# Patient Record
Sex: FEMALE | Race: WHITE | Employment: OTHER | URBAN - METROPOLITAN AREA
[De-identification: names, ages, dates, MRNs, and addresses within clinical notes are randomized per-mention and may not be internally consistent; named-entity substitution may affect disease eponyms.]

---

## 2017-03-17 PROBLEM — H02.403 PTOSIS, BILATERAL: Status: ACTIVE | Noted: 2017-03-17

## 2017-03-17 PROBLEM — H40.003 GLAUCOMA SUSPECT, BILATERAL: Status: ACTIVE | Noted: 2017-03-17

## 2017-12-05 PROCEDURE — 88305 TISSUE EXAM BY PATHOLOGIST: CPT | Performed by: INTERNAL MEDICINE

## 2018-02-12 PROBLEM — Z96.1 PSEUDOPHAKIA: Status: ACTIVE | Noted: 2018-02-12

## 2018-02-12 PROBLEM — E11.9 DIABETES MELLITUS TYPE 2 WITHOUT RETINOPATHY (HCC): Status: ACTIVE | Noted: 2018-02-12

## 2018-02-22 PROCEDURE — 81003 URINALYSIS AUTO W/O SCOPE: CPT | Performed by: FAMILY MEDICINE

## 2018-06-04 PROBLEM — R79.89 ELEVATED LFTS: Status: ACTIVE | Noted: 2018-06-04

## 2019-02-14 PROCEDURE — 82570 ASSAY OF URINE CREATININE: CPT | Performed by: FAMILY MEDICINE

## 2019-02-14 PROCEDURE — 82043 UR ALBUMIN QUANTITATIVE: CPT | Performed by: FAMILY MEDICINE

## 2019-02-19 PROCEDURE — 87086 URINE CULTURE/COLONY COUNT: CPT | Performed by: FAMILY MEDICINE

## 2019-02-19 PROCEDURE — 81001 URINALYSIS AUTO W/SCOPE: CPT | Performed by: FAMILY MEDICINE

## 2019-03-27 PROCEDURE — 86225 DNA ANTIBODY NATIVE: CPT | Performed by: INTERNAL MEDICINE

## 2019-03-27 PROCEDURE — 86038 ANTINUCLEAR ANTIBODIES: CPT | Performed by: INTERNAL MEDICINE

## 2019-03-27 PROCEDURE — 82085 ASSAY OF ALDOLASE: CPT | Performed by: INTERNAL MEDICINE

## 2019-03-27 PROCEDURE — 86235 NUCLEAR ANTIGEN ANTIBODY: CPT | Performed by: INTERNAL MEDICINE

## 2019-03-27 PROCEDURE — 83516 IMMUNOASSAY NONANTIBODY: CPT | Performed by: INTERNAL MEDICINE

## 2019-05-29 PROCEDURE — 86160 COMPLEMENT ANTIGEN: CPT | Performed by: INTERNAL MEDICINE

## 2019-05-29 PROCEDURE — 81001 URINALYSIS AUTO W/SCOPE: CPT | Performed by: INTERNAL MEDICINE

## 2019-05-29 PROCEDURE — 36415 COLL VENOUS BLD VENIPUNCTURE: CPT | Performed by: INTERNAL MEDICINE

## 2019-05-29 PROCEDURE — 87186 SC STD MICRODIL/AGAR DIL: CPT | Performed by: INTERNAL MEDICINE

## 2019-05-29 PROCEDURE — 86376 MICROSOMAL ANTIBODY EACH: CPT | Performed by: INTERNAL MEDICINE

## 2019-05-29 PROCEDURE — 87077 CULTURE AEROBIC IDENTIFY: CPT | Performed by: INTERNAL MEDICINE

## 2019-05-29 PROCEDURE — 87086 URINE CULTURE/COLONY COUNT: CPT | Performed by: INTERNAL MEDICINE

## 2019-05-29 PROCEDURE — 86800 THYROGLOBULIN ANTIBODY: CPT | Performed by: INTERNAL MEDICINE

## 2019-07-09 PROCEDURE — 81003 URINALYSIS AUTO W/O SCOPE: CPT | Performed by: FAMILY MEDICINE

## 2019-07-09 PROCEDURE — 87086 URINE CULTURE/COLONY COUNT: CPT | Performed by: FAMILY MEDICINE

## 2019-09-23 PROCEDURE — 88304 TISSUE EXAM BY PATHOLOGIST: CPT | Performed by: OPHTHALMOLOGY

## 2020-01-29 PROBLEM — J84.9 ILD (INTERSTITIAL LUNG DISEASE) (HCC): Status: ACTIVE | Noted: 2020-01-29

## 2020-01-29 PROBLEM — K55.9 VASCULAR DISORDER OF INTESTINE, UNSPECIFIED (HCC): Status: ACTIVE | Noted: 2020-01-29

## 2021-06-03 NOTE — CM/SW NOTE
6/9/2021  SW received call from pt regarding concerns for discharge planning. Pt scheduled for a L knee surgery with Dr. Dora Bowles on 6/24. Pt confirms address and states she lives in a 1 level house with 0 steps to go in.  Pt lives alone and states her f will transport the pt placed on WILL CALL to [facility] form/ flow sheet completed. RN to attach and print out with After Visit Summary Packet.      [facility] [phone]    PLAN: [facility] pending med clear, [facility]

## 2021-06-22 ENCOUNTER — LAB ENCOUNTER (OUTPATIENT)
Dept: LAB | Facility: HOSPITAL | Age: 81
End: 2021-06-22
Attending: ORTHOPAEDIC SURGERY
Payer: MEDICARE

## 2021-06-22 DIAGNOSIS — Z01.818 PREOPERATIVE TESTING: ICD-10-CM

## 2021-06-22 DIAGNOSIS — E87.1 HYPONATREMIA: ICD-10-CM

## 2021-06-22 DIAGNOSIS — D64.9 ANEMIA, UNSPECIFIED TYPE: ICD-10-CM

## 2021-06-22 PROCEDURE — 36415 COLL VENOUS BLD VENIPUNCTURE: CPT

## 2021-06-22 PROCEDURE — 86901 BLOOD TYPING SEROLOGIC RH(D): CPT

## 2021-06-22 PROCEDURE — 86850 RBC ANTIBODY SCREEN: CPT

## 2021-06-22 PROCEDURE — 85025 COMPLETE CBC W/AUTO DIFF WBC: CPT

## 2021-06-22 PROCEDURE — 80048 BASIC METABOLIC PNL TOTAL CA: CPT

## 2021-06-22 PROCEDURE — 86900 BLOOD TYPING SEROLOGIC ABO: CPT

## 2021-06-22 NOTE — H&P
[facility]    PATIENT'S NAME: Rogelio Magdalene SIMMS   ATTENDING PHYSICIAN: Jony Chakraborty MD   PATIENT ACCOUNT#:   [id number]    LOCATION:    MEDICAL RECORD #:   [id number]       YOB: 1940  ADMISSION DATE:       06/24/2021    HIS pulse 76. She weighs 152 pounds. BMI of 30. HEENT:  Age appropriate, within normal limits. CHEST:  Clear to auscultation and percussion. HEART:  Regular rate and rhythm, normal S1 and S2, without murmurs or rubs. ABDOMEN:  Soft, nontender.   Normal den

## 2021-06-23 NOTE — PAT NURSING NOTE
Patient instructed to call Dr Mark Bailey regarding her allergy to sulfa which means she will not tolerate celebrex as ordered pre op

## 2021-06-24 ENCOUNTER — ANESTHESIA EVENT (OUTPATIENT)
Dept: SURGERY | Facility: HOSPITAL | Age: 81
End: 2021-06-24
Payer: MEDICARE

## 2021-06-24 ENCOUNTER — APPOINTMENT (OUTPATIENT)
Dept: GENERAL RADIOLOGY | Facility: HOSPITAL | Age: 81
End: 2021-06-24
Attending: PHYSICIAN ASSISTANT
Payer: MEDICARE

## 2021-06-24 ENCOUNTER — ANESTHESIA (OUTPATIENT)
Dept: SURGERY | Facility: HOSPITAL | Age: 81
End: 2021-06-24
Payer: MEDICARE

## 2021-06-24 ENCOUNTER — HOSPITAL ENCOUNTER (OUTPATIENT)
Facility: HOSPITAL | Age: 81
Discharge: NURSING, REHAB OR LONG-TERM CARE | End: 2021-06-27
Attending: ORTHOPAEDIC SURGERY | Admitting: ORTHOPAEDIC SURGERY
Payer: MEDICARE

## 2021-06-24 DIAGNOSIS — Z01.818 PREOPERATIVE TESTING: Primary | ICD-10-CM

## 2021-06-24 DIAGNOSIS — M17.12 PRIMARY OSTEOARTHRITIS OF LEFT KNEE: ICD-10-CM

## 2021-06-24 PROCEDURE — 88311 DECALCIFY TISSUE: CPT | Performed by: ORTHOPAEDIC SURGERY

## 2021-06-24 PROCEDURE — 0SRD0J9 REPLACEMENT OF LEFT KNEE JOINT WITH SYNTHETIC SUBSTITUTE, CEMENTED, OPEN APPROACH: ICD-10-PCS | Performed by: ORTHOPAEDIC SURGERY

## 2021-06-24 PROCEDURE — 73560 X-RAY EXAM OF KNEE 1 OR 2: CPT | Performed by: PHYSICIAN ASSISTANT

## 2021-06-24 PROCEDURE — 88305 TISSUE EXAM BY PATHOLOGIST: CPT | Performed by: ORTHOPAEDIC SURGERY

## 2021-06-24 PROCEDURE — 82962 GLUCOSE BLOOD TEST: CPT

## 2021-06-24 DEVICE — REFOBACIN BC R 1X40 US: Type: IMPLANTABLE DEVICE | Site: KNEE | Status: FUNCTIONAL

## 2021-06-24 DEVICE — PSN FEM PS CMT CCR NRW SZ5 L: Type: IMPLANTABLE DEVICE | Site: KNEE | Status: FUNCTIONAL

## 2021-06-24 DEVICE — PSN STR HYB ST 14X+30 M: Type: IMPLANTABLE DEVICE | Site: KNEE | Status: FUNCTIONAL

## 2021-06-24 DEVICE — PSN TIB STM 5 DEG SZ C L: Type: IMPLANTABLE DEVICE | Site: KNEE | Status: FUNCTIONAL

## 2021-06-24 DEVICE — PSNA CM FM/CM TB/CPS VE SUR/ST: Type: IMPLANTABLE DEVICE | Status: FUNCTIONAL

## 2021-06-24 DEVICE — PSN ALL POLY PAT PLY 32MM: Type: IMPLANTABLE DEVICE | Site: KNEE | Status: FUNCTIONAL

## 2021-06-24 RX ORDER — BUPIVACAINE HYDROCHLORIDE 7.5 MG/ML
INJECTION, SOLUTION INTRASPINAL AS NEEDED
Status: DISCONTINUED | OUTPATIENT
Start: 2021-06-24 | End: 2021-06-24 | Stop reason: SURG

## 2021-06-24 RX ORDER — DEXAMETHASONE SODIUM PHOSPHATE 4 MG/ML
VIAL (ML) INJECTION AS NEEDED
Status: DISCONTINUED | OUTPATIENT
Start: 2021-06-24 | End: 2021-06-24 | Stop reason: SURG

## 2021-06-24 RX ORDER — HYDROCODONE BITARTRATE AND ACETAMINOPHEN 5; 325 MG/1; MG/1
1 TABLET ORAL AS NEEDED
Status: DISCONTINUED | OUTPATIENT
Start: 2021-06-24 | End: 2021-06-24 | Stop reason: HOSPADM

## 2021-06-24 RX ORDER — DIPHENHYDRAMINE HCL 25 MG
25 CAPSULE ORAL EVERY 4 HOURS PRN
Status: DISCONTINUED | OUTPATIENT
Start: 2021-06-24 | End: 2021-06-27

## 2021-06-24 RX ORDER — CEFAZOLIN SODIUM/WATER 2 G/20 ML
2 SYRINGE (ML) INTRAVENOUS EVERY 8 HOURS
Status: COMPLETED | OUTPATIENT
Start: 2021-06-24 | End: 2021-06-25

## 2021-06-24 RX ORDER — SENNOSIDES 8.6 MG
17.2 TABLET ORAL NIGHTLY
Status: DISCONTINUED | OUTPATIENT
Start: 2021-06-24 | End: 2021-06-27

## 2021-06-24 RX ORDER — DEXTROSE MONOHYDRATE 25 G/50ML
50 INJECTION, SOLUTION INTRAVENOUS
Status: DISCONTINUED | OUTPATIENT
Start: 2021-06-24 | End: 2021-06-24 | Stop reason: HOSPADM

## 2021-06-24 RX ORDER — HYDRALAZINE HYDROCHLORIDE 25 MG/1
25 TABLET, FILM COATED ORAL 2 TIMES DAILY
Status: DISCONTINUED | OUTPATIENT
Start: 2021-06-24 | End: 2021-06-27

## 2021-06-24 RX ORDER — MORPHINE SULFATE 4 MG/ML
4 INJECTION, SOLUTION INTRAMUSCULAR; INTRAVENOUS EVERY 10 MIN PRN
Status: DISCONTINUED | OUTPATIENT
Start: 2021-06-24 | End: 2021-06-24 | Stop reason: HOSPADM

## 2021-06-24 RX ORDER — HYDROCODONE BITARTRATE AND ACETAMINOPHEN 10; 325 MG/1; MG/1
1 TABLET ORAL EVERY 4 HOURS PRN
Status: DISCONTINUED | OUTPATIENT
Start: 2021-06-24 | End: 2021-06-27

## 2021-06-24 RX ORDER — POLYETHYLENE GLYCOL 3350 17 G/17G
17 POWDER, FOR SOLUTION ORAL DAILY PRN
Status: DISCONTINUED | OUTPATIENT
Start: 2021-06-24 | End: 2021-06-27

## 2021-06-24 RX ORDER — HYDROMORPHONE HYDROCHLORIDE 1 MG/ML
0.6 INJECTION, SOLUTION INTRAMUSCULAR; INTRAVENOUS; SUBCUTANEOUS EVERY 5 MIN PRN
Status: DISCONTINUED | OUTPATIENT
Start: 2021-06-24 | End: 2021-06-24 | Stop reason: HOSPADM

## 2021-06-24 RX ORDER — DIPHENHYDRAMINE HYDROCHLORIDE 50 MG/ML
25 INJECTION INTRAMUSCULAR; INTRAVENOUS ONCE AS NEEDED
Status: ACTIVE | OUTPATIENT
Start: 2021-06-24 | End: 2021-06-24

## 2021-06-24 RX ORDER — ACETAMINOPHEN 500 MG
1000 TABLET ORAL ONCE
Status: DISCONTINUED | OUTPATIENT
Start: 2021-06-24 | End: 2021-06-24 | Stop reason: HOSPADM

## 2021-06-24 RX ORDER — LIDOCAINE HYDROCHLORIDE 10 MG/ML
INJECTION, SOLUTION EPIDURAL; INFILTRATION; INTRACAUDAL; PERINEURAL AS NEEDED
Status: DISCONTINUED | OUTPATIENT
Start: 2021-06-24 | End: 2021-06-24 | Stop reason: SURG

## 2021-06-24 RX ORDER — LEVOTHYROXINE SODIUM 0.03 MG/1
25 TABLET ORAL DAILY
Status: DISCONTINUED | OUTPATIENT
Start: 2021-06-25 | End: 2021-06-27

## 2021-06-24 RX ORDER — ASPIRIN 325 MG
325 TABLET, DELAYED RELEASE (ENTERIC COATED) ORAL 2 TIMES DAILY
Status: DISCONTINUED | OUTPATIENT
Start: 2021-06-24 | End: 2021-06-27

## 2021-06-24 RX ORDER — NALOXONE HYDROCHLORIDE 0.4 MG/ML
80 INJECTION, SOLUTION INTRAMUSCULAR; INTRAVENOUS; SUBCUTANEOUS AS NEEDED
Status: DISCONTINUED | OUTPATIENT
Start: 2021-06-24 | End: 2021-06-24 | Stop reason: HOSPADM

## 2021-06-24 RX ORDER — MORPHINE SULFATE 4 MG/ML
2 INJECTION, SOLUTION INTRAMUSCULAR; INTRAVENOUS EVERY 10 MIN PRN
Status: DISCONTINUED | OUTPATIENT
Start: 2021-06-24 | End: 2021-06-24 | Stop reason: HOSPADM

## 2021-06-24 RX ORDER — MONTELUKAST SODIUM 10 MG/1
10 TABLET ORAL NIGHTLY
Status: DISCONTINUED | OUTPATIENT
Start: 2021-06-24 | End: 2021-06-27

## 2021-06-24 RX ORDER — DIPHENHYDRAMINE HYDROCHLORIDE 50 MG/ML
12.5 INJECTION INTRAMUSCULAR; INTRAVENOUS EVERY 4 HOURS PRN
Status: DISCONTINUED | OUTPATIENT
Start: 2021-06-24 | End: 2021-06-27

## 2021-06-24 RX ORDER — MIDAZOLAM HYDROCHLORIDE 1 MG/ML
INJECTION INTRAMUSCULAR; INTRAVENOUS AS NEEDED
Status: DISCONTINUED | OUTPATIENT
Start: 2021-06-24 | End: 2021-06-24 | Stop reason: SURG

## 2021-06-24 RX ORDER — DEXTROSE MONOHYDRATE 25 G/50ML
50 INJECTION, SOLUTION INTRAVENOUS
Status: DISCONTINUED | OUTPATIENT
Start: 2021-06-24 | End: 2021-06-27

## 2021-06-24 RX ORDER — DOCUSATE SODIUM 100 MG/1
100 CAPSULE, LIQUID FILLED ORAL 2 TIMES DAILY
Status: DISCONTINUED | OUTPATIENT
Start: 2021-06-24 | End: 2021-06-27

## 2021-06-24 RX ORDER — GABAPENTIN 300 MG/1
300 CAPSULE ORAL NIGHTLY
Status: DISCONTINUED | OUTPATIENT
Start: 2021-06-24 | End: 2021-06-27

## 2021-06-24 RX ORDER — ONDANSETRON 2 MG/ML
4 INJECTION INTRAMUSCULAR; INTRAVENOUS EVERY 4 HOURS PRN
Status: DISCONTINUED | OUTPATIENT
Start: 2021-06-24 | End: 2021-06-27

## 2021-06-24 RX ORDER — MORPHINE SULFATE 10 MG/ML
6 INJECTION, SOLUTION INTRAMUSCULAR; INTRAVENOUS EVERY 10 MIN PRN
Status: DISCONTINUED | OUTPATIENT
Start: 2021-06-24 | End: 2021-06-24 | Stop reason: HOSPADM

## 2021-06-24 RX ORDER — HALOPERIDOL 5 MG/ML
0.25 INJECTION INTRAMUSCULAR ONCE AS NEEDED
Status: DISCONTINUED | OUTPATIENT
Start: 2021-06-24 | End: 2021-06-24 | Stop reason: HOSPADM

## 2021-06-24 RX ORDER — PROCHLORPERAZINE EDISYLATE 5 MG/ML
5 INJECTION INTRAMUSCULAR; INTRAVENOUS ONCE AS NEEDED
Status: DISCONTINUED | OUTPATIENT
Start: 2021-06-24 | End: 2021-06-24 | Stop reason: HOSPADM

## 2021-06-24 RX ORDER — ONDANSETRON 2 MG/ML
INJECTION INTRAMUSCULAR; INTRAVENOUS AS NEEDED
Status: DISCONTINUED | OUTPATIENT
Start: 2021-06-24 | End: 2021-06-24 | Stop reason: SURG

## 2021-06-24 RX ORDER — ONDANSETRON 2 MG/ML
4 INJECTION INTRAMUSCULAR; INTRAVENOUS ONCE AS NEEDED
Status: DISCONTINUED | OUTPATIENT
Start: 2021-06-24 | End: 2021-06-24 | Stop reason: HOSPADM

## 2021-06-24 RX ORDER — HYDROCODONE BITARTRATE AND ACETAMINOPHEN 5; 325 MG/1; MG/1
1 TABLET ORAL EVERY 4 HOURS PRN
Status: DISCONTINUED | OUTPATIENT
Start: 2021-06-24 | End: 2021-06-27

## 2021-06-24 RX ORDER — HYDROMORPHONE HYDROCHLORIDE 1 MG/ML
0.2 INJECTION, SOLUTION INTRAMUSCULAR; INTRAVENOUS; SUBCUTANEOUS EVERY 5 MIN PRN
Status: DISCONTINUED | OUTPATIENT
Start: 2021-06-24 | End: 2021-06-24 | Stop reason: HOSPADM

## 2021-06-24 RX ORDER — SODIUM CHLORIDE, SODIUM LACTATE, POTASSIUM CHLORIDE, CALCIUM CHLORIDE 600; 310; 30; 20 MG/100ML; MG/100ML; MG/100ML; MG/100ML
INJECTION, SOLUTION INTRAVENOUS CONTINUOUS
Status: DISCONTINUED | OUTPATIENT
Start: 2021-06-24 | End: 2021-06-24 | Stop reason: HOSPADM

## 2021-06-24 RX ORDER — LORAZEPAM 0.5 MG/1
0.5 TABLET ORAL 2 TIMES DAILY PRN
Status: DISCONTINUED | OUTPATIENT
Start: 2021-06-24 | End: 2021-06-27

## 2021-06-24 RX ORDER — SODIUM CHLORIDE, SODIUM LACTATE, POTASSIUM CHLORIDE, CALCIUM CHLORIDE 600; 310; 30; 20 MG/100ML; MG/100ML; MG/100ML; MG/100ML
INJECTION, SOLUTION INTRAVENOUS CONTINUOUS
Status: DISCONTINUED | OUTPATIENT
Start: 2021-06-24 | End: 2021-06-27

## 2021-06-24 RX ORDER — HYDROCODONE BITARTRATE AND ACETAMINOPHEN 5; 325 MG/1; MG/1
2 TABLET ORAL AS NEEDED
Status: DISCONTINUED | OUTPATIENT
Start: 2021-06-24 | End: 2021-06-24 | Stop reason: HOSPADM

## 2021-06-24 RX ORDER — PROCHLORPERAZINE EDISYLATE 5 MG/ML
10 INJECTION INTRAMUSCULAR; INTRAVENOUS EVERY 6 HOURS PRN
Status: ACTIVE | OUTPATIENT
Start: 2021-06-24 | End: 2021-06-26

## 2021-06-24 RX ORDER — CEFAZOLIN SODIUM/WATER 2 G/20 ML
2 SYRINGE (ML) INTRAVENOUS ONCE
Status: COMPLETED | OUTPATIENT
Start: 2021-06-24 | End: 2021-06-24

## 2021-06-24 RX ORDER — HYDROMORPHONE HYDROCHLORIDE 1 MG/ML
0.4 INJECTION, SOLUTION INTRAMUSCULAR; INTRAVENOUS; SUBCUTANEOUS EVERY 5 MIN PRN
Status: DISCONTINUED | OUTPATIENT
Start: 2021-06-24 | End: 2021-06-24 | Stop reason: HOSPADM

## 2021-06-24 RX ORDER — SODIUM PHOSPHATE, DIBASIC AND SODIUM PHOSPHATE, MONOBASIC 7; 19 G/133ML; G/133ML
1 ENEMA RECTAL ONCE AS NEEDED
Status: DISCONTINUED | OUTPATIENT
Start: 2021-06-24 | End: 2021-06-27

## 2021-06-24 RX ORDER — BISACODYL 10 MG
10 SUPPOSITORY, RECTAL RECTAL
Status: DISCONTINUED | OUTPATIENT
Start: 2021-06-24 | End: 2021-06-27

## 2021-06-24 RX ORDER — SODIUM CHLORIDE 9 MG/ML
INJECTION, SOLUTION INTRAVENOUS CONTINUOUS
Status: DISCONTINUED | OUTPATIENT
Start: 2021-06-24 | End: 2021-06-25

## 2021-06-24 RX ADMIN — SODIUM CHLORIDE, SODIUM LACTATE, POTASSIUM CHLORIDE, CALCIUM CHLORIDE: 600; 310; 30; 20 INJECTION, SOLUTION INTRAVENOUS at 15:48:00

## 2021-06-24 RX ADMIN — SODIUM CHLORIDE, SODIUM LACTATE, POTASSIUM CHLORIDE, CALCIUM CHLORIDE: 600; 310; 30; 20 INJECTION, SOLUTION INTRAVENOUS at 14:03:00

## 2021-06-24 RX ADMIN — MIDAZOLAM HYDROCHLORIDE 2 MG: 1 INJECTION INTRAMUSCULAR; INTRAVENOUS at 14:03:00

## 2021-06-24 RX ADMIN — ONDANSETRON 4 MG: 2 INJECTION INTRAMUSCULAR; INTRAVENOUS at 14:15:00

## 2021-06-24 RX ADMIN — CEFAZOLIN SODIUM/WATER 2 G: 2 G/20 ML SYRINGE (ML) INTRAVENOUS at 14:18:00

## 2021-06-24 RX ADMIN — LIDOCAINE HYDROCHLORIDE 10 MG: 10 INJECTION, SOLUTION EPIDURAL; INFILTRATION; INTRACAUDAL; PERINEURAL at 14:09:00

## 2021-06-24 RX ADMIN — BUPIVACAINE HYDROCHLORIDE 1.4 ML: 7.5 INJECTION, SOLUTION INTRASPINAL at 14:12:00

## 2021-06-24 RX ADMIN — DEXAMETHASONE SODIUM PHOSPHATE 4 MG: 4 MG/ML VIAL (ML) INJECTION at 14:15:00

## 2021-06-24 NOTE — H&P
[facility] Hospitalist H&P       CC: No chief complaint on file.      PCP: Susan Gonzalez MD    History of Present Illness:   80year old female with history of hypertension, hypothyroidism, KAMRON, TIA, diabetes mellitus type 2, who presents to the hospital for 10/29/2012    Procedure: LEFT PHACOEMULSIFICATION OF CATARACT WITH INTRAOCULAR LENS IMPLANT [id number];  Surgeon: Bernard Costello MD;  Location: [address] [place] [address]   • [facility] CATARACT EXTRACAP,INSERT LENS  10/15/2012    Procedure: RIGHT PHACOEMULSIFICATION Oral Tab, TAKE 1 TABLET BY MOUTH TWICE A DAY, Disp: 180 tablet, Rfl: 0  metFORMIN HCl 500 MG Oral Tab, Take 1 tablet (500 mg total) by mouth 2 (two) times daily. , Disp: 180 tablet, Rfl: 1  SYNTHROID 25 MCG Oral Tab, TAKE 1 TABLET ONCE DAILY, Disp: 90 table rash, normal color    Diagnostic Data:    CBC/Chem  Recent Labs   Lab 06/22/21  1014   WBC 5.0   HGB 11.9*   MCV 81.7   .0       Recent Labs   Lab 06/22/21  1014   *   K 4.7      CO2 28.0   BUN 13   CREATSERUM 0.74   *   CA 10.3*

## 2021-06-24 NOTE — PLAN OF CARE
Problem: Diabetes/Glucose Control  Goal: Glucose maintained within prescribed range  Description: INTERVENTIONS:  - Monitor Blood Glucose as ordered  - Assess for signs and symptoms of hyperglycemia and hypoglycemia  - Administer ordered medications to m assistance with activity based on assessment  - Modify environment to reduce risk of injury  - Provide assistive devices as appropriate  - Consider OT/PT consult to assist with strengthening/mobility  - Encourage toileting schedule  Outcome: Progressing

## 2021-06-24 NOTE — ANESTHESIA PREPROCEDURE EVALUATION
Anesthesia PreOp Note    HPI:     Leonides Schirmer is a 80year old female who presents for preoperative consultation requested by: Sandeep Brandon MD    Date of Surgery: 6/24/2021    Procedure(s):  LEFT TOTAL KNEE ARTHROPLASTY  Indication: Primary pressure    • High cholesterol    • Hypothyroidism    • Interstitial lung disease (HCC)    • Ischemic colitis (HCC)    • Migraines    • Obstructive sleep apnea (adult) (pediatric) PSG 5-17-16    AHI 5 RDI 5 REM AHI 14 SaO2 86 %   • Osteoarthritis    • Slee INTRAOCULAR LENS IMPLANT 54425;  Surgeon: Edrick Oppenheim, MD;  Location: [address] [place] [address]   • [address] [place] [address] CATARACT EXTRACAP,INSERT LENS  10/29/2012    Procedure: LEFT PHACOEMULSIFICATION OF CATARACT WITH INTRAOCULAR LENS IMPLANT 43223;  Surgeon: Smitha Preciado, tablet, Rfl: 1, 6/24/2021 at 0900  ezetimibe 10 MG Oral Tab, TAKE 1 TABLET ONCE DAILY, Disp: 90 tablet, Rfl: 1, 6/23/2021 at 2200  LANSOPRAZOLE 30 MG Oral Capsule Delayed Release, TAKE 1 CAPSULE (30 MG TOTAL) BY MOUTH EVERY MORNING BEFORE BREAKFAST., Disp: Grandmother 57        63   • Arthritis Paternal Grandmother         arthritis hands, knees hips   • No Known Problems Daughter    • Arthritis Paternal Uncle      Social History    Socioeconomic History      Marital status:       Spouse name: Not on 06/03/2021    RBC 4.38 06/22/2021    RBC 4.28 06/03/2021    HGB 11.9 (L) 06/22/2021    HGB 11.4 (L) 06/03/2021    HCT 35.8 06/22/2021    HCT 33.9 (L) 06/03/2021    MCV 81.7 06/22/2021    MCV 79.2 (L) 06/03/2021    MCH 27.2 06/22/2021    MCH 26.6 (L) 06/03/ regular  Rate: normal  ROS comment: Normal stress test 2017    Neuro/Psych    (+)  TIA,     (-) seizures, CVA    GI/Hepatic/Renal    (+) GERD, liver disease,   (-) renal disease    Endo/Other    (+) diabetes mellitus type 2 well controlled,   (-) blood dys

## 2021-06-24 NOTE — INTERVAL H&P NOTE
Pre-op Diagnosis: Primary osteoarthritis of left knee [M17.12]    The above referenced H&P was reviewed by Cesilia Silva MD on 6/24/2021, the patient was examined and no significant changes have occurred in the patient's condition since the H&P was pe

## 2021-06-24 NOTE — ANESTHESIA PROCEDURE NOTES
Spinal Block  Performed by: Riri Riggins CRNA  Authorized by: Mateusz Adam MD       General Information and Staff    Start Time:  6/24/2021 2:09 PM  End Time:  6/24/2021 2:12 PM  Anesthesiologist:  Mateusz Adam MD  CRNA:  Riri Riggins,

## 2021-06-24 NOTE — ANESTHESIA POSTPROCEDURE EVALUATION
Patient: Maurizio Logan    Procedure Summary     Date: 06/24/21 Room / Location: [address] [place] [address] MAIN OR 12 / [address] [place] [address] MAIN OR    Anesthesia Start: 3673 Anesthesia Stop: 5897    Procedure: LEFT TOTAL KNEE ARTHROPLASTY (Left Knee) Diagnosis:       Primary osteoarthriti

## 2021-06-25 ENCOUNTER — APPOINTMENT (OUTPATIENT)
Dept: ULTRASOUND IMAGING | Facility: HOSPITAL | Age: 81
End: 2021-06-25
Attending: INTERNAL MEDICINE
Payer: MEDICARE

## 2021-06-25 PROCEDURE — 97535 SELF CARE MNGMENT TRAINING: CPT

## 2021-06-25 PROCEDURE — 82962 GLUCOSE BLOOD TEST: CPT

## 2021-06-25 PROCEDURE — 97162 PT EVAL MOD COMPLEX 30 MIN: CPT

## 2021-06-25 PROCEDURE — 97166 OT EVAL MOD COMPLEX 45 MIN: CPT

## 2021-06-25 PROCEDURE — 97116 GAIT TRAINING THERAPY: CPT

## 2021-06-25 PROCEDURE — 93971 EXTREMITY STUDY: CPT | Performed by: INTERNAL MEDICINE

## 2021-06-25 PROCEDURE — 85018 HEMOGLOBIN: CPT | Performed by: PHYSICIAN ASSISTANT

## 2021-06-25 PROCEDURE — 85014 HEMATOCRIT: CPT | Performed by: PHYSICIAN ASSISTANT

## 2021-06-25 PROCEDURE — 83036 HEMOGLOBIN GLYCOSYLATED A1C: CPT | Performed by: INTERNAL MEDICINE

## 2021-06-25 RX ORDER — AMLODIPINE BESYLATE 10 MG/1
10 TABLET ORAL DAILY
Status: DISCONTINUED | OUTPATIENT
Start: 2021-06-25 | End: 2021-06-27

## 2021-06-25 RX ORDER — PANTOPRAZOLE SODIUM 40 MG/1
40 TABLET, DELAYED RELEASE ORAL
Status: DISCONTINUED | OUTPATIENT
Start: 2021-06-25 | End: 2021-06-27

## 2021-06-25 NOTE — PROGRESS NOTES
POD#1 s/p Left TKA  Pain well controlled on Plato  Early rehab progressing    Lab with post op acute blood loss - tolerated    Xray reviewed    Dressing removed, Clean and dry  Calfs non tender, DNVI    Imp: POD#1 s/p Left TKA - Ortho stable    Plan: 325 A

## 2021-06-25 NOTE — PLAN OF CARE
[employer] is an 80year old female. left total knee arthroplasty, dressing to left knee clean dry and intact. No nausea, vomiting, chest pain or shortness of breath. Pain controlled with ice pack and norco. Miranda removed by night nurse.  Patient was able to vo specific interventions  Outcome: Progressing     Problem: PAIN - ADULT  Goal: Verbalizes/displays adequate comfort level or patient's stated pain goal  Description: INTERVENTIONS:  - Encourage pt to monitor pain and request assistance  - Assess pain using needed discharge resources and transportation as appropriate  - Identify discharge learning needs (meds, wound care, etc)  - Arrange for interpreters to assist at discharge as needed  - Consider post-discharge preferences of patient/family/discharge partne signs/symptoms of CO2 retention  Outcome: Progressing     Problem: GENITOURINARY - ADULT  Goal: Absence of urinary retention  Description: INTERVENTIONS:  - Assess patient’s ability to void and empty bladder  - Monitor intake/output and perform bladder sca forceful nose blowing  Outcome: Progressing     Problem: MUSCULOSKELETAL - ADULT  Goal: Return mobility to safest level of function  Description: INTERVENTIONS:  - Assess patient stability and activity tolerance for standing, transferring and ambulating w/

## 2021-06-25 NOTE — OCCUPATIONAL THERAPY NOTE
OCCUPATIONAL THERAPY EVALUATION - INPATIENT      Room Number: 435/435-A  Evaluation Date: 6/25/2021  Type of Evaluation: Initial  Presenting Problem:  (L TKA)    Physician Order: IP Consult to Occupational Therapy  Reason for Therapy: ADL/IADL Dysfunction training;Patient/Family education;Patient/Family training;Equipment eval/education; Compensatory technique education       OCCUPATIONAL THERAPY MEDICAL/SOCIAL HISTORY     Problem List   Active Problems:    Primary osteoarthritis of left knee      Past Medic Hailey Noland MD;  Location: [address] [place] [address]   • PATIENT [address] [place] [address] FOR IV ANTIBIOTIC SURGICAL SITE INFECTION PROPHYLAXIS.   10/29/2012    Procedure: LEFT PHACOEMULSIFICATION OF CATARACT WITH INTRAOCULAR LENS IMPLANT [id number];  Surg except LUE 2+/5       ACTIVITIES OF DAILY LIVING ASSESSMENT  AM-PAC ‘6-Clicks’ Inpatient Daily Activity Short Form  How much help from another person does the patient currently need…  -   Putting on and taking off regular lower body clothing?: A Lot  -   B

## 2021-06-25 NOTE — PHYSICAL THERAPY NOTE
Attempted physical therapy treatment. Patient has venous doppler pending to r/o DVT. Will defer physical therapy treatment at this time.

## 2021-06-25 NOTE — OPERATIVE REPORT
[facility]    PATIENT'S NAME: Dariana Lynne SIMMS   ATTENDING PHYSICIAN: Jony El MD   OPERATING PHYSICIAN: Jony El MD   PATIENT ACCOUNT#:   [id number]    LOCATION:  [address] [place] [address] #:   [id number]       DATE anesthetic, followed by Miranda catheter, followed by being placed in supine position. All pressure points well padded and smooth Webril was placed over the proximal right tibia and left thigh. A pneumatic pressure tourniquet cuff was placed over this.   Grace Alexander anterior or posterior instability in flexion. She had good stability to varus and valgus stress in full extension. No additional bone cuts were necessary. The tibia was sized to a size C.   A laminar  was placed and fine tuning of the soft tissue fresh bed of cement. All components were held under compression until the cement had fully hardened.   While this was going on, irrigation performed followed by 3 g of tranexamic acid in 100 mL of normal saline which was left in the wound for approximately

## 2021-06-25 NOTE — PHYSICAL THERAPY NOTE
PHYSICAL THERAPY KNEE EVALUATION - INPATIENT     Room Number: 435/435-A  Evaluation Date: 6/25/2021  Type of Evaluation: Initial   Physician Order: PT Eval and Treat    Presenting Problem: Primary osteoarthritis of left knee s/p left total knee arthroplas address these deficits in preparation for discharge. DISCHARGE RECOMMENDATIONS  PT Discharge Recommendations: Sub-acute rehabilitation    PLAN  PT Treatment Plan: Bed mobility; Body mechanics; Endurance; Energy conservation;Gait training;Patient education; Procedure: LEFT PHACOEMULSIFICATION OF CATARACT WITH INTRAOCULAR LENS IMPLANT 65883;  Surgeon: Annmarie Stone MD;  Location: [address] [place] [address]   • PATIENT [address] FOR IV ANTIBIOTIC SURGICAL SITE INFECTION PROPHYLAXIS.   10/15/ functional limits    RANGE OF MOTION AND STRENGTH ASSESSMENT  Upper extremity ROM and strength are within functional limits   Lower extremity ROM is within functional limits (left knee -5-50 degrees)  Lower extremity strength is within functional limits Status    Goal #3 Patient is able to ambulate 200 feet with assist device: walker - rolling at assistance level: modified independent   Goal #3   Current Status    Goal #4 Patient will negotiate 1 stairs/one curb w/ assistive device and supervision   Goal

## 2021-06-25 NOTE — PROGRESS NOTES
[facility] Hospitalist Progress Note     CC: Hospital Follow up    PCP: Hung Ludwig MD       Assessment/Plan:     Active Problems:    Primary osteoarthritis of left knee  Patient is a 80year old female with history of hypertension, hypothyroidism, KAMRON, TIA, hours) at 6/25/2021 1116  Last data filed at 6/25/2021 1039  Gross per 24 hour   Intake 3550 ml   Output 1250 ml   Net 2300 ml       Last 3 Weights  06/24/21 1100 : 151 lb (68.5 kg)  06/23/21 0804 : 152 lb (68.9 kg)  06/18/21 1500 : 152 lb (68.9 kg)  06/07 CC     • lactated ringers       sodium chloride 0.9%, PEG 3350, magnesium hydroxide, bisacodyl, Fleet Enema, ondansetron HCl, Prochlorperazine Edisylate, diphenhydrAMINE **OR** diphenhydrAMINE HCl, HYDROcodone-acetaminophen **OR** HYDROcodone-acetaminophen

## 2021-06-25 NOTE — PLAN OF CARE
A&Ox 4, Numbness to left toes, but has resolved. Bilateral dorsi/plantar flexion strong. ASA/scds/teds in place. Tele in place. No calls overnight. 2L O2 per NC. Hx of KAMRON, does not use CPAP at home. ETCO2 in place r/t low respirations, but improved.  Enc Goals  Goal: Patient/Family Long Term Goal  Description: Patient's Long Term Goal: To return home    Interventions:  - Vitals stable  PT/OT   Pain tolerable.   - See additional Care Plan goals for specific interventions  Outcome: Progressing  Goal: Patient/ injury  - Provide assistive devices as appropriate  - Consider OT/PT consult to assist with strengthening/mobility  - Encourage toileting schedule  Outcome: Progressing     Problem: DISCHARGE PLANNING  Goal: Discharge to home or other facility with appropr respiratory effort  - Oxygen supplementation based on oxygen saturation or ABGs  - Provide Smoking Cessation handout, if applicable  - Encourage broncho-pulmonary hygiene including cough, deep breathe, Incentive Spirometry  - Assess the need for suctioning injections, enemas and rectal medication administration  - Ensure safe mobilization of patient  - Hold pressure on venipuncture sites to achieve adequate hemostasis  - Assess for signs and symptoms of internal bleeding  - Monitor lab trends  - Patient is t

## 2021-06-26 PROCEDURE — 83735 ASSAY OF MAGNESIUM: CPT | Performed by: INTERNAL MEDICINE

## 2021-06-26 PROCEDURE — 82962 GLUCOSE BLOOD TEST: CPT

## 2021-06-26 PROCEDURE — 85025 COMPLETE CBC W/AUTO DIFF WBC: CPT | Performed by: INTERNAL MEDICINE

## 2021-06-26 PROCEDURE — 97110 THERAPEUTIC EXERCISES: CPT

## 2021-06-26 PROCEDURE — 97530 THERAPEUTIC ACTIVITIES: CPT

## 2021-06-26 PROCEDURE — 80048 BASIC METABOLIC PNL TOTAL CA: CPT | Performed by: INTERNAL MEDICINE

## 2021-06-26 PROCEDURE — 81015 MICROSCOPIC EXAM OF URINE: CPT | Performed by: INTERNAL MEDICINE

## 2021-06-26 RX ORDER — FLUTICASONE PROPIONATE 50 MCG
1 SPRAY, SUSPENSION (ML) NASAL DAILY
Status: DISCONTINUED | OUTPATIENT
Start: 2021-06-26 | End: 2021-06-27

## 2021-06-26 RX ORDER — CALCIUM CARBONATE 200(500)MG
500 TABLET,CHEWABLE ORAL 3 TIMES DAILY PRN
Status: DISCONTINUED | OUTPATIENT
Start: 2021-06-26 | End: 2021-06-27

## 2021-06-26 RX ORDER — LOSARTAN POTASSIUM 100 MG/1
100 TABLET ORAL DAILY
Status: DISCONTINUED | OUTPATIENT
Start: 2021-06-26 | End: 2021-06-27

## 2021-06-26 NOTE — PLAN OF CARE
Trice Deshpande is post op day #2 S/P left knee. Alert and Ox3, Neuro checks intact. Up a one assist w walker, pain managed w norco tab PRN. BG ac/hs as ordered-  sliding scale as ordered. C/o gas pains- got order for tums.  C/o feeling like she has UTI symptoms- st s/s infection- will continue to monitor- c/o feeling like she may have UTI- orders rec;d for U/A     Problem: SAFETY ADULT - FALL  Goal: Free from fall injury  Description: INTERVENTIONS:  - Assess pt frequently for physical needs  - Identify cognitive and rhythm, and trends  - Monitor for bleeding, hypotension and signs of decreased cardiac output  - Evaluate effectiveness of vasoactive medications to optimize hemodynamic stability  - Monitor arterial and/or venous puncture sites for bleeding and/or hematom

## 2021-06-26 NOTE — PHYSICAL THERAPY NOTE
PHYSICAL THERAPY KNEE TREATMENT NOTE - INPATIENT     Room Number: 435/435-A             Presenting Problem: Primary osteoarthritis of left knee s/p left total knee arthroplasty    Problem List  Active Problems:    Primary osteoarthritis of left knee      P back to sitting on the side of the bed?: None   How much help from another person does the patient currently need. ..   -   Moving to and from a bed to a chair (including a wheelchair)?: A Little   -   Need to walk in hospital room?: [address],[place] [address]

## 2021-06-26 NOTE — PROGRESS NOTES
POD#2 s/p Left TKA  Some nausea and headache this AM  Pain controlled with orals    Lab with H/H 8.1/25.5    Left knee with ecchymosis into calf  Some calf tenderness, Doppler neg for DVT  DNVI   Dressing clean and dry    Imp: POD#2 s/p left TKA  Slow reha

## 2021-06-26 NOTE — PROGRESS NOTES
[facility] Hospitalist Progress Note     CC: Hospital Follow up    PCP: Marely Fontaine MD       Assessment/Plan:     Active Problems:    Primary osteoarthritis of left knee  Patient is a 80year old female with history of hypertension, hypothyroidism, KAMRON, TIA, (Last 24 hours) at 6/26/2021 0907  Last data filed at 6/26/2021 0700  Gross per 24 hour   Intake 895 ml   Output 150 ml   Net 745 ml       Last 3 Weights  06/24/21 1100 : 151 lb (68.5 kg)  06/23/21 0804 : 152 lb (68.9 kg)  06/18/21 1500 : 152 lb (68.9 kg) with internal shadowing calcification. This is in the area of the patient's pain. Correlate clinically.     Dictated by (CST): Marlen Easton MD on 6/25/2021 at 4:29 PM     Finalized by (CST): Marlen Easton MD on 6/25/2021 at 4:30 PM              Meds:

## 2021-06-26 NOTE — PLAN OF CARE
Problem: Patient Centered Care  Goal: Patient preferences are identified and integrated in the patient's plan of care  Description: Interventions:  - What would you like us to know as we care for you?  I live at home with my daughter.  - Provide timely, c evaluate response  - Consider cultural and social influences on pain and pain management  - Manage/alleviate anxiety  - Utilize distraction and/or relaxation techniques  - Monitor for opioid side effects  - Notify MD/LIP if interventions unsuccessful or pa discharge planning if the patient needs post-hospital services based on physician/LIP order or complex needs related to functional status, cognitive ability or social support system  Outcome: Progressing     Problem: CARDIOVASCULAR - ADULT  Goal: Maintains promote bladder emptying  Outcome: Progressing     Problem: SKIN/TISSUE INTEGRITY - ADULT  Goal: Incision(s), wounds(s) or drain site(s) healing without S/S of infection  Description: INTERVENTIONS:  - Assess and document risk factors for pressure ulcer de mobilization  - Obtain PT/OT consults as needed  - Advance activity as appropriate  - Communicate ordered activity level and limitations with patient/family  Outcome: Progressing     Alert and oriented x4, can be anxious at times.  Pain reported 7-8/10 at a

## 2021-06-27 VITALS
HEART RATE: 82 BPM | TEMPERATURE: 99 F | BODY MASS INDEX: 29.64 KG/M2 | RESPIRATION RATE: 18 BRPM | WEIGHT: 151 LBS | HEIGHT: 60 IN | DIASTOLIC BLOOD PRESSURE: 46 MMHG | OXYGEN SATURATION: 93 % | SYSTOLIC BLOOD PRESSURE: 145 MMHG

## 2021-06-27 PROCEDURE — 97110 THERAPEUTIC EXERCISES: CPT

## 2021-06-27 PROCEDURE — 83735 ASSAY OF MAGNESIUM: CPT | Performed by: INTERNAL MEDICINE

## 2021-06-27 PROCEDURE — 97530 THERAPEUTIC ACTIVITIES: CPT

## 2021-06-27 PROCEDURE — 97116 GAIT TRAINING THERAPY: CPT

## 2021-06-27 PROCEDURE — 85025 COMPLETE CBC W/AUTO DIFF WBC: CPT | Performed by: INTERNAL MEDICINE

## 2021-06-27 PROCEDURE — 82962 GLUCOSE BLOOD TEST: CPT

## 2021-06-27 PROCEDURE — 80048 BASIC METABOLIC PNL TOTAL CA: CPT | Performed by: INTERNAL MEDICINE

## 2021-06-27 RX ORDER — HYDROCODONE BITARTRATE AND ACETAMINOPHEN 5; 325 MG/1; MG/1
TABLET ORAL
Qty: 20 TABLET | Refills: 0 | Status: SHIPPED | OUTPATIENT
Start: 2021-06-27 | End: 2021-10-04

## 2021-06-27 RX ORDER — LORAZEPAM 0.5 MG/1
0.5 TABLET ORAL 2 TIMES DAILY PRN
Qty: 14 TABLET | Refills: 1 | Status: SHIPPED | OUTPATIENT
Start: 2021-06-27 | End: 2021-08-11 | Stop reason: ALTCHOICE

## 2021-06-27 RX ORDER — PANTOPRAZOLE SODIUM 40 MG/1
40 TABLET, DELAYED RELEASE ORAL
Status: DISCONTINUED | OUTPATIENT
Start: 2021-06-27 | End: 2021-06-27

## 2021-06-27 NOTE — PLAN OF CARE
Trice Deshpande has been updated on the discharge plan- going to rehab at [facility] today- medicar set up for 1pm. premedicated with xanax and norco- scripts in packet- report to nurse More Segal at facility- history, care plan and medications reviewed.  All

## 2021-06-27 NOTE — PHYSICAL THERAPY NOTE
PHYSICAL THERAPY TREATMENT NOTE - INPATIENT     Room Number: 626/189-C       Presenting Problem: Primary osteoarthritis of left knee s/p left total knee arthroplasty    Problem List  Active Problems:    Primary osteoarthritis of left knee      PHYSICAL THE patient currently have. ..  -   Turning over in bed (including adjusting bedclothes, sheets and blankets)?: None   -   Sitting down on and standing up from a chair with arms (e.g., wheelchair, bedside commode, etc.): A Little   -   Moving from lying on back independence with home activity/exercise instructions provided to patient in preparation for discharge.    Goal #5   Current Status In progress   Goal #6    Goal #6  Current Status

## 2021-06-27 NOTE — CM/SW NOTE
Notified by Shantell Chavez RN that patient is ready for discharge. Called [facility] [address] liaison and confirmed discharge today. Niki Kaylyn- [phone] at [facility] Report [phone]    Called pt in her room and she is agreeable to plan.  She is

## 2021-06-27 NOTE — DISCHARGE SUMMARY
General Medicine Discharge Summary     Patient ID:  Reina Brock  80year old  6/15/1940    Admit date: 6/24/2021    Discharge date and time: No discharge date for patient encounter.  6/27/21    Attending Physician: Kipp Cockayne, MD     Consult outpatient for her arthritis as well, ok to hold while here can resume on dc  -PT eval with plan for [facility], this is appropriate as patient lives alone and recovered well after last rehab stay  - plan for transfer today      Hypertension  -chronically on norv List      START taking these medications    aspirin 325 MG Tbec  Take 1 tablet (325 mg total) by mouth 2 (two) times a day.         CONTINUE taking these medications    amLODIPine Besylate 10 MG Tabs  Commonly known as: NORVASC  TAKE 1 TABLET ONCE DAILY as: Miley Palma           Where to Get Your Medications      These medications were sent to [address] [place] [address] [address].  AT [place], [phone], [address] [address]    Phone: [phone]   · a

## 2021-06-27 NOTE — PLAN OF CARE
Postop day 2 to 3. Up with 1 assist and RW.  UA neg for UTI. Norco PRN for pain management. Plan for discharge to [facility] [address]       Problem: Patient Centered Care  Goal: Patient preferences are identified and integrated in the patient's ronan scale  - Administer analgesics based on type and severity of pain and evaluate response  - Implement non-pharmacological measures as appropriate and evaluate response  - Consider cultural and social influences on pain and pain management  - Manage/alleviat form as appropriate  - Assess patient's ability to be responsible for managing their own health  - Refer to Case Management Department for coordinating discharge planning if the patient needs post-hospital services based on physician/LIP order or complex n urinary retention protocol/standard of care  - Consider collaborating with pharmacy to review patient's medication profile  - Implement strategies to promote bladder emptying  Outcome: Progressing     Problem: SKIN/TISSUE INTEGRITY - ADULT  Goal: Incision( devices  - Assist with transfers and ambulation using safe patient handling equipment as needed  - Ensure adequate protection for wounds/incisions during mobilization  - Obtain PT/OT consults as needed  - Advance activity as appropriate  - Communicate orde

## (undated) DEVICE — HOOD: Brand: FLYTE

## (undated) DEVICE — SOL  .9 1000ML BTL

## (undated) DEVICE — 60 ML SYRINGE LUER-LOCK TIP: Brand: MONOJECT

## (undated) DEVICE — BOWL CEMENT MIX QUICK-VAC

## (undated) DEVICE — GAUZE SPONGES,12 PLY: Brand: CURITY

## (undated) DEVICE — DERMABOND LIQUID ADHESIVE

## (undated) DEVICE — DRAPE SRG 70X60IN SPLT U IMPRV

## (undated) DEVICE — 25MM FEMALE SCREW-Z

## (undated) DEVICE — CONTAINER SPEC STR 4OZ GRY LID

## (undated) DEVICE — 3M™ STERI-DRAPE™ INSTRUMENT POUCH 1018: Brand: STERI-DRAPE™

## (undated) DEVICE — DRESSING 10X4IN ANMC SAFETAC

## (undated) DEVICE — WRAP COOLING KNEE W/ICE PILLOW

## (undated) DEVICE — STERILE TETRA-FLEX CF, ELASTIC BANDAGE, 4" X 5.5YD: Brand: TETRA-FLEX™CF

## (undated) DEVICE — NEEDLE SPINAL 20X3-1/2 YELLOW

## (undated) DEVICE — SOL  .9 3000ML

## (undated) DEVICE — SUTURE VLOC 90 3-0 23\" 0034

## (undated) DEVICE — SUTURE MONOCRYL 2-0 Y945H

## (undated) DEVICE — ENCORE® LATEX ACCLAIM SIZE 8.5, STERILE LATEX POWDER-FREE SURGICAL GLOVE: Brand: ENCORE

## (undated) DEVICE — Device: Brand: STABLECUT®

## (undated) DEVICE — 48MM HEADED SCREW-Z

## (undated) DEVICE — HEX-LOCKING BLADE ELECTRODE: Brand: EDGE

## (undated) DEVICE — DRAPE SHEET LG

## (undated) DEVICE — CHLORAPREP 26ML APPLICATOR

## (undated) DEVICE — Device

## (undated) DEVICE — GAMMEX® PI HYBRID SIZE 7.5, STERILE POWDER-FREE SURGICAL GLOVE, POLYISOPRENE AND NEOPRENE BLEND: Brand: GAMMEX

## (undated) DEVICE — WEBRIL COTTON UNDERCAST PADDING: Brand: WEBRIL

## (undated) DEVICE — DEV STRATAFIX PDS + SUTR 0 CTX

## (undated) DEVICE — CHLORAPREP ORANGE TINT 10.5ML

## (undated) DEVICE — INTENDED FOR TISSUE SEPARATION, AND OTHER PROCEDURES THAT REQUIRE A SHARP SURGICAL BLADE TO PUNCTURE OR CUT.: Brand: BARD-PARKER ® STAINLESS STEEL BLADES

## (undated) DEVICE — ELECTRODE BALL 5MM DBL-511

## (undated) DEVICE — TOTAL KNEE: Brand: MEDLINE INDUSTRIES, INC.

## (undated) DEVICE — SUTURE STRATAFIX PDS PLUS 45CM

## (undated) DEVICE — TROCAR-Z

## (undated) DEVICE — PADDING 4YDX6IN CTTN STRL WBRL

## (undated) NOTE — IP AVS SNAPSHOT
Patient Demographics     Address  [address] [place] [address] Road [address] Phone  [phone] (Home) *Preferred*  [phone] [facility]) E-mail Address  [email or web address]      Emergency Contact(s)     Name Relation Home Work [address] [place] [address] 2 sprays by Nasal route 2 (two) times daily.    Steve Kam MD         ezetimibe 10 MG Tabs  Commonly known as: Corinda Resides 1 TABLET ONCE DAILY   Steve Kam MD         Fluticasone Propionate 50 MCG/ACT Susp  Commonly known as: FLONASE      by MCG Tabs  Generic drug: Levothyroxine Sodium  Next dose due: Morning       TAKE 1 TABLET ONCE DAILY   Ashly Brown MD               Where to Get Your Medications      These medications were sent to [address] [place] [address]. 06/26/21 2009 Given      [id number] docusate sodium (COLACE) cap 100 mg 06/27/21 0827 Given      [id number] gabapentin (NEURONTIN) cap 300 mg 06/26/21 2009 Given      [id number] hydrALAzine HCl (APRESOLINE) tab 25 mg 06/26/21 2009 Given      [id number] hydrALA 9880, Result status: Final result   Ordering provider: Aide Waddell MD  06/26/21 4512 Resulting lab: [facility] [place] [facility])    Specimen Information    Type Source Collected On   Blood — 06/27/21 0583          Components    Compon Microbiology Results (All)     None         H&P - H&P Note      H&P signed by Johny Jeans, DO at 6/24/2021 12:19 PM  Version 2 of 2    Author: Johny Jeans, DO Service: Hospitalist Author Type: Physician    Filed: 6/24/2021 12:19 PM Date of Service: 6/ 18982;  Surgeon: Gustavo Chavarria MD;  Location: [address] [place] [address]   • PATIENT [address] [place] [address] FOR IV ANTIBIOTIC SURGICAL SITE INFECTION PROPHYLAXIS.   10/15/2012    Procedure: RIGHT PHACOEMULSIFICATION OF CATARACT WITH INTRAOCULAR LENS IM 100 MG Oral Cap, TAKE 1 CAPSULE EVERY       MORNING AND 2 CAPSULES     EVERY EVENING, Disp: 270 capsule, Rfl: 0  Azelastine HCl 0.1 % Nasal Solution, 2 sprays by Nasal route 2 (two) times daily. , Disp: 1 Bottle, Rfl: 0  AMLODIPINE BESYLATE 10 MG Oral Tab, Pulse 72   Temp 98.9 °F (37.2 °C) (Oral)   Resp 16   Ht 5' (1.524 m)   Wt 151 lb (68.5 kg)   LMP 01/04/1978   SpO2 99%   BMI 29.49 kg/m²   General: Alert, no acute distress  HEENT: atraumatic, sclera anicteric, oral mucosa normal   Neck: non tender  Lung AA.1 Rosas Barton DO on 6/24/2021 12:09 PM  AA. 2 - Rossi Adams DO on 6/24/2021 12:19 PM               H&P signed by Rossi Adams DO at 6/24/2021 12:17 PM  Version 1 of 2    Author: Rossi Adams DO Service: Hospitalist Author Type: Physician    [place] CATARACT WITH INTRAOCULAR LENS IMPLANT [id number];  Surgeon: Evy Godoy MD;  Location: [address] [place] [address]   • PATIENT [address] [place] [address] FOR IV ANTIBIOTIC SURGICAL SITE INFECTION PROPHYLAXIS.   10/15/2012    Procedure: RIGHT PHACOEMULSIFICATI DAILY, Disp: 90 tablet, Rfl: 0  GABAPENTIN 100 MG Oral Cap, TAKE 1 CAPSULE EVERY       MORNING AND 2 CAPSULES     EVERY EVENING, Disp: 270 capsule, Rfl: 0  Azelastine HCl 0.1 % Nasal Solution, 2 sprays by Nasal route 2 (two) times daily. , Disp: 1 Bottle, R OBJECTIVE:  /41 (BP Location: Right arm)   Pulse 72   Temp 98.9 °F (37.2 °C) (Oral)   Resp 16   Ht 5' (1.524 m)   Wt 151 lb (68.5 kg)   LMP 01/04/1978   SpO2 99%   BMI 29.49 kg/m²   General: Alert, no acute distress  HEENT: atraumatic, sclera anicter Jenniffer Alegre DO on 6/24/2021 12:09 PM                     D/C Summary    No notes of this type exist for this encounter.         Physical Therapy Notes (last 72 hours) (Notes from 6/24/2021 11:12 AM through 6/27/2021 11:12 AM)      Physical Therapy Note si Activity promotion; Body mechanics;Repositioning    BALANCE  Static Sitting: Good  Dynamic Sitting: Fair +  Static Standing: Fair -  Dynamic Standing: Poor +    ACTIVITY TOLERANCE                         O2 WALK       AM-PAC '6-Clicks' INPATIENT SHORT FORM able to ambulate 200 feet with assist device: walker - rolling at assistance level: modified independent   Goal #3   Current Status NT   Goal #4 Patient will negotiate 1 stairs/one curb w/ assistive device and supervision   Goal #4   Current Status NT   Go session. Patient is a 80year old female admitted 6/24/2021 for Presenting Problem: Primary osteoarthritis of left knee s/p left total knee arthroplasty. Patient presented in bed with 5/10 pain.  Education provided on Weight bearing status, Physical therapy Problem List  Active Problems:    Primary osteoarthritis of left knee      Past Medical History  Past Medical History:   Diagnosis Date   • Diabetes ([facility] [address]    • Disorder of thyroid hypothyroid   • Esophageal reflux    • High blood pressure    • High cholest LEFT PHACOEMULSIFICATION OF CATARACT WITH INTRAOCULAR LENS IMPLANT [id number];  Surgeon: Pedro Ayala MD;  Location: [address] [place] [address]   • [facility] CATARACT EXTRACAP,INSERT LENS  10/15/2012    Procedure: RIGHT PHACOEMULSIFICATION OF CATARACT WITH INTRAOCU bedclothes, sheets and blankets)?: None   -   Sitting down on and standing up from a chair with arms (e.g., wheelchair, bedside commode, etc.): A Little   -   Moving from lying on back to sitting on the side of the bed?: None   How much help from another p CONNOR Marcos on 6/25/2021 11:54 AM                        Occupational Therapy Notes (last 72 hours) (Notes from 6/24/2021 11:12 AM through 6/27/2021 11:12 AM)      Occupational Therapy Note signed by Iker Ken OT at 6/25/2021  1:04 PM  Version 1 of 1 patient's Approx Degree of Impairment: 53.32% has been calculated based on documentation in the [facility] '6 clicks' Inpatient Daily Activity Short Form. Research supports that patients with this level of impairment may benefit from KATELYN.     DISCHARGE RECOMMEN [address]   • PATIENT DOCUMENTED NOT TO HAVE EXPERIENCED ANY OF THE FOLLOWING EVENTS  10/29/2012    Procedure: LEFT PHACOEMULSIFICATION OF CATARACT WITH INTRAOCULAR LENS IMPLANT [id number];  Surgeon: Jordon Brown MD;  Location: [address] [place] [address] L Lower Extremity: Weight Bearing as Tolerated    PAIN ASSESSMENT  Ratin  Location:  (LLE )  Management Techniques: Repositioning;Relaxation (RN reports pt had received pain medication earlier )      COGNITION  Overall Cognitive Status:  WFL - within f complete LE dressing with SBA with AE prn. Comment:     Patient will complete toilet transfer with SBA. Comment:     Patient will complete self care tasks standing at sink level with SBA. Comment:    Patient will complete toileting with SBA.    Comme Interdisciplinary Progressing    Description: Patient's Short Term Goal: To walk 150 ft prior to discharge    Interventions:   - PT/OT  Pain tolerable.   - See additional Care Plan goals for specific interventions

## (undated) NOTE — IP AVS SNAPSHOT
[facility]            (For Outpatient Use Only) Initial Admit Date: 6/24/2021   Inpt/Obs Admit Date: Inpt: N/A / Obs: N/A   Discharge Date:    Hospital Acct:  [de-identified]   MRN: [de-identified]   CSN: [id number]   CEID: [id number]        ENCOUNT Pt Rel to Subscriber: SELF   TERTIARY INSURANCE   Payor:  Plan:    Group Number:  Insurance Type:    Subscriber Name:  Subscriber :    Subscriber ID:  Pt Rel to Subscriber:    Hospital Account Financial Class: Medicare    2021